# Patient Record
Sex: MALE | Race: WHITE | NOT HISPANIC OR LATINO | Employment: FULL TIME | URBAN - METROPOLITAN AREA
[De-identification: names, ages, dates, MRNs, and addresses within clinical notes are randomized per-mention and may not be internally consistent; named-entity substitution may affect disease eponyms.]

---

## 2019-01-14 ENCOUNTER — OFFICE VISIT - HEALTHEAST (OUTPATIENT)
Dept: FAMILY MEDICINE | Facility: CLINIC | Age: 54
End: 2019-01-14

## 2019-01-14 DIAGNOSIS — K04.7 DENTAL ABSCESS: ICD-10-CM

## 2019-01-14 ASSESSMENT — MIFFLIN-ST. JEOR: SCORE: 1731.22

## 2019-04-03 ENCOUNTER — RECORDS - HEALTHEAST (OUTPATIENT)
Dept: ADMINISTRATIVE | Facility: OTHER | Age: 54
End: 2019-04-03

## 2021-05-01 ENCOUNTER — HEALTH MAINTENANCE LETTER (OUTPATIENT)
Age: 56
End: 2021-05-01

## 2021-05-25 ENCOUNTER — RECORDS - HEALTHEAST (OUTPATIENT)
Dept: ADMINISTRATIVE | Facility: CLINIC | Age: 56
End: 2021-05-25

## 2021-06-02 VITALS — BODY MASS INDEX: 23.1 KG/M2 | HEIGHT: 74 IN | WEIGHT: 180 LBS

## 2021-06-23 NOTE — PROGRESS NOTES
"Assessment:     1. Dental abscess            Plan:     Differential diagnosis include but not limited to dental abscess or submandibular abscess.  Discussed with the patient on exam he does have an abscess, in this clinic when not equipped to do dental procedures therefore I will refer him to emergency room for possible I&D and may be CT scan to make sure that he does not have submandibular abscess.  Patient was in agreement with the plan of care, he would drive his personal vehicle to the emergency room.    Subjective:       53 y.o. male presents for evaluation of possible dental abscess.  Patient reports that his symptoms started on Thursday, he has been having the symptoms for the past 5 days.  He was seen at the dentist today where he was sent to the emergency room the patient came here at the walk-in clinic instead.  He admits that it is painful on the left side of his cheek, he also notes that it swollen.  Currently his pain level is 10 out of 10.  He has not taken any medications, Tylenol currently not working.  He was not given any medications from the dentist.    The following portions of the patient's history were reviewed and updated as appropriate: allergies, current medications, past family history, past medical history, past social history, past surgical history and problem list.    Review of Systems  A 12 point comprehensive review of systems was negative except as noted.      Objective:      /88 (Patient Site: Right Arm, Patient Position: Sitting, Cuff Size: Adult Large)   Pulse 97   Temp 97.9  F (36.6  C) (Oral)   Resp 18   Ht 6' 2\" (1.88 m)   Wt 180 lb (81.6 kg)   SpO2 99%   BMI 23.11 kg/m    General appearance: alert, appears stated age, cooperative and moderate distress  Head: Normocephalic, without obvious abnormality, atraumatic, sinuses nontender to percussion  Eyes: conjunctivae/corneas clear. PERRL, EOM's intact. Fundi benign.  Ears: normal TM's and external ear canals both " ears  Nose: Nares normal. Septum midline. Mucosa normal. No drainage or sinus tenderness.  Throat: abnormal findings: Abscess noted on the left side of the mandible, pain with palpation  Neck: no adenopathy, no carotid bruit, no JVD, supple, symmetrical, trachea midline, thyroid not enlarged, symmetric, no tenderness/mass/nodules and Moderate pain with palpation to the left side of the neck.  Lungs: clear to auscultation bilaterally  Heart: regular rate and rhythm, S1, S2 normal, no murmur, click, rub or gallop  Extremities: extremities normal, atraumatic, no cyanosis or edema  Pulses: 2+ and symmetric  Skin: Skin color, texture, turgor normal. No rashes or lesions  Lymph nodes: Cervical, supraclavicular, and axillary nodes normal.  Neurologic: Grossly normal     This note has been dictated using voice recognition software. Any grammatical or context distortions are unintentional and inherent to the software

## 2021-10-16 ENCOUNTER — HEALTH MAINTENANCE LETTER (OUTPATIENT)
Age: 56
End: 2021-10-16

## 2022-01-06 ENCOUNTER — IMMUNIZATION (OUTPATIENT)
Dept: NURSING | Facility: CLINIC | Age: 57
End: 2022-01-06
Payer: COMMERCIAL

## 2022-01-06 PROCEDURE — 91306 COVID-19,PF,MODERNA (18+ YRS BOOSTER .25ML): CPT

## 2022-01-06 PROCEDURE — 0064A COVID-19,PF,MODERNA (18+ YRS BOOSTER .25ML): CPT

## 2022-05-22 ENCOUNTER — HEALTH MAINTENANCE LETTER (OUTPATIENT)
Age: 57
End: 2022-05-22

## 2022-09-25 ENCOUNTER — HEALTH MAINTENANCE LETTER (OUTPATIENT)
Age: 57
End: 2022-09-25

## 2023-06-04 ENCOUNTER — HEALTH MAINTENANCE LETTER (OUTPATIENT)
Age: 58
End: 2023-06-04

## 2024-06-14 ENCOUNTER — APPOINTMENT (OUTPATIENT)
Dept: CT IMAGING | Facility: CLINIC | Age: 59
End: 2024-06-14
Attending: PHYSICIAN ASSISTANT

## 2024-06-14 ENCOUNTER — HOSPITAL ENCOUNTER (EMERGENCY)
Facility: CLINIC | Age: 59
Discharge: HOME | End: 2024-06-14
Admitting: EMERGENCY MEDICINE

## 2024-06-14 VITALS
WEIGHT: 200 LBS | SYSTOLIC BLOOD PRESSURE: 143 MMHG | BODY MASS INDEX: 25.67 KG/M2 | HEART RATE: 62 BPM | RESPIRATION RATE: 19 BRPM | OXYGEN SATURATION: 96 % | DIASTOLIC BLOOD PRESSURE: 99 MMHG | HEIGHT: 74 IN | TEMPERATURE: 97.8 F

## 2024-06-14 DIAGNOSIS — N20.1 URETEROLITHIASIS: ICD-10-CM

## 2024-06-14 LAB
ALBUMIN UR-MCNC: 10 MG/DL
AMORPH CRY #/AREA URNS HPF: ABNORMAL /HPF
ANION GAP SERPL CALCULATED.3IONS-SCNC: 11 MMOL/L (ref 7–15)
APPEARANCE UR: ABNORMAL
BILIRUB UR QL STRIP: NEGATIVE
BUN SERPL-MCNC: 24.9 MG/DL (ref 6–20)
CALCIUM SERPL-MCNC: 11.8 MG/DL (ref 8.6–10)
CHLORIDE SERPL-SCNC: 102 MMOL/L (ref 98–107)
COLOR UR AUTO: ABNORMAL
CREAT SERPL-MCNC: 1.47 MG/DL (ref 0.67–1.17)
DEPRECATED HCO3 PLAS-SCNC: 24 MMOL/L (ref 22–29)
EGFRCR SERPLBLD CKD-EPI 2021: 56 ML/MIN/1.73M2
ERYTHROCYTE [DISTWIDTH] IN BLOOD BY AUTOMATED COUNT: 12.2 % (ref 10–15)
GLUCOSE SERPL-MCNC: 109 MG/DL (ref 70–99)
GLUCOSE UR STRIP-MCNC: NEGATIVE MG/DL
HCT VFR BLD AUTO: 52.7 % (ref 40–53)
HGB BLD-MCNC: 17.6 G/DL (ref 13.3–17.7)
HGB UR QL STRIP: ABNORMAL
HYALINE CASTS: 5 /LPF
KETONES UR STRIP-MCNC: NEGATIVE MG/DL
LACTATE SERPL-SCNC: 0.9 MMOL/L (ref 0.7–2)
LEUKOCYTE ESTERASE UR QL STRIP: ABNORMAL
MCH RBC QN AUTO: 31.3 PG (ref 26.5–33)
MCHC RBC AUTO-ENTMCNC: 33.4 G/DL (ref 31.5–36.5)
MCV RBC AUTO: 94 FL (ref 78–100)
MUCOUS THREADS #/AREA URNS LPF: PRESENT /LPF
NITRATE UR QL: NEGATIVE
PH UR STRIP: 7 [PH] (ref 5–7)
PLATELET # BLD AUTO: 239 10E3/UL (ref 150–450)
POTASSIUM SERPL-SCNC: 4.5 MMOL/L (ref 3.4–5.3)
RBC # BLD AUTO: 5.63 10E6/UL (ref 4.4–5.9)
RBC URINE: 16 /HPF
SODIUM SERPL-SCNC: 137 MMOL/L (ref 135–145)
SP GR UR STRIP: 1.01 (ref 1–1.03)
UROBILINOGEN UR STRIP-MCNC: <2 MG/DL
WBC # BLD AUTO: 15.2 10E3/UL (ref 4–11)
WBC URINE: 7 /HPF

## 2024-06-14 PROCEDURE — 250N000011 HC RX IP 250 OP 636: Mod: JZ | Performed by: PHYSICIAN ASSISTANT

## 2024-06-14 PROCEDURE — 85027 COMPLETE CBC AUTOMATED: CPT | Performed by: PHYSICIAN ASSISTANT

## 2024-06-14 PROCEDURE — 258N000003 HC RX IP 258 OP 636: Mod: JZ | Performed by: EMERGENCY MEDICINE

## 2024-06-14 PROCEDURE — 96361 HYDRATE IV INFUSION ADD-ON: CPT

## 2024-06-14 PROCEDURE — 250N000011 HC RX IP 250 OP 636: Mod: JZ | Performed by: EMERGENCY MEDICINE

## 2024-06-14 PROCEDURE — 74176 CT ABD & PELVIS W/O CONTRAST: CPT

## 2024-06-14 PROCEDURE — 83605 ASSAY OF LACTIC ACID: CPT | Performed by: EMERGENCY MEDICINE

## 2024-06-14 PROCEDURE — 99285 EMERGENCY DEPT VISIT HI MDM: CPT | Mod: 25

## 2024-06-14 PROCEDURE — 36415 COLL VENOUS BLD VENIPUNCTURE: CPT | Performed by: PHYSICIAN ASSISTANT

## 2024-06-14 PROCEDURE — 87086 URINE CULTURE/COLONY COUNT: CPT | Performed by: EMERGENCY MEDICINE

## 2024-06-14 PROCEDURE — 36415 COLL VENOUS BLD VENIPUNCTURE: CPT | Performed by: EMERGENCY MEDICINE

## 2024-06-14 PROCEDURE — 96374 THER/PROPH/DIAG INJ IV PUSH: CPT

## 2024-06-14 PROCEDURE — 80048 BASIC METABOLIC PNL TOTAL CA: CPT | Performed by: PHYSICIAN ASSISTANT

## 2024-06-14 PROCEDURE — 96375 TX/PRO/DX INJ NEW DRUG ADDON: CPT

## 2024-06-14 PROCEDURE — 81003 URINALYSIS AUTO W/O SCOPE: CPT | Performed by: EMERGENCY MEDICINE

## 2024-06-14 RX ORDER — OXYCODONE HYDROCHLORIDE 5 MG/1
5 TABLET ORAL EVERY 4 HOURS PRN
Qty: 12 TABLET | Refills: 0 | Status: SHIPPED | OUTPATIENT
Start: 2024-06-14 | End: 2024-06-18

## 2024-06-14 RX ORDER — ONDANSETRON 2 MG/ML
4 INJECTION INTRAMUSCULAR; INTRAVENOUS ONCE
Status: COMPLETED | OUTPATIENT
Start: 2024-06-14 | End: 2024-06-14

## 2024-06-14 RX ORDER — KETOROLAC TROMETHAMINE 15 MG/ML
15 INJECTION, SOLUTION INTRAMUSCULAR; INTRAVENOUS ONCE
Status: COMPLETED | OUTPATIENT
Start: 2024-06-14 | End: 2024-06-14

## 2024-06-14 RX ORDER — TAMSULOSIN HYDROCHLORIDE 0.4 MG/1
0.4 CAPSULE ORAL DAILY
Qty: 5 CAPSULE | Refills: 0 | Status: SHIPPED | OUTPATIENT
Start: 2024-06-14 | End: 2024-06-19

## 2024-06-14 RX ORDER — DIMENHYDRINATE 50 MG
50 TABLET ORAL AT BEDTIME
Qty: 7 TABLET | Refills: 0 | Status: SHIPPED | OUTPATIENT
Start: 2024-06-14 | End: 2024-06-21

## 2024-06-14 RX ORDER — HYDROMORPHONE HYDROCHLORIDE 1 MG/ML
0.5 INJECTION, SOLUTION INTRAMUSCULAR; INTRAVENOUS; SUBCUTANEOUS ONCE
Status: COMPLETED | OUTPATIENT
Start: 2024-06-14 | End: 2024-06-14

## 2024-06-14 RX ORDER — IBUPROFEN 200 MG
400 TABLET ORAL EVERY 6 HOURS
Qty: 56 TABLET | Refills: 0 | Status: SHIPPED | OUTPATIENT
Start: 2024-06-14 | End: 2024-06-21

## 2024-06-14 RX ORDER — DIMENHYDRINATE 50 MG
50 TABLET ORAL EVERY 6 HOURS PRN
Qty: 28 TABLET | Refills: 0 | Status: SHIPPED | OUTPATIENT
Start: 2024-06-14 | End: 2024-06-21

## 2024-06-14 RX ORDER — ACETAMINOPHEN 500 MG
1000 TABLET ORAL EVERY 6 HOURS
Qty: 56 TABLET | Refills: 0 | Status: SHIPPED | OUTPATIENT
Start: 2024-06-14 | End: 2024-06-21

## 2024-06-14 RX ORDER — CIPROFLOXACIN 500 MG/1
500 TABLET, FILM COATED ORAL 2 TIMES DAILY
Qty: 14 TABLET | Refills: 0 | Status: SHIPPED | OUTPATIENT
Start: 2024-06-14 | End: 2024-06-21

## 2024-06-14 RX ADMIN — HYDROMORPHONE HYDROCHLORIDE 0.5 MG: 1 INJECTION, SOLUTION INTRAMUSCULAR; INTRAVENOUS; SUBCUTANEOUS at 16:30

## 2024-06-14 RX ADMIN — SODIUM CHLORIDE 1000 ML: 9 INJECTION, SOLUTION INTRAVENOUS at 16:30

## 2024-06-14 RX ADMIN — KETOROLAC TROMETHAMINE 15 MG: 15 INJECTION, SOLUTION INTRAMUSCULAR; INTRAVENOUS at 13:55

## 2024-06-14 RX ADMIN — ONDANSETRON 4 MG: 2 INJECTION INTRAMUSCULAR; INTRAVENOUS at 13:55

## 2024-06-14 ASSESSMENT — ENCOUNTER SYMPTOMS
FEVER: 1
VOMITING: 0
DIARRHEA: 0
CHILLS: 1
DYSURIA: 0
HEMATURIA: 0
ABDOMINAL PAIN: 1
FLANK PAIN: 1
NAUSEA: 1

## 2024-06-14 ASSESSMENT — COLUMBIA-SUICIDE SEVERITY RATING SCALE - C-SSRS
2. HAVE YOU ACTUALLY HAD ANY THOUGHTS OF KILLING YOURSELF IN THE PAST MONTH?: NO
6. HAVE YOU EVER DONE ANYTHING, STARTED TO DO ANYTHING, OR PREPARED TO DO ANYTHING TO END YOUR LIFE?: NO
1. IN THE PAST MONTH, HAVE YOU WISHED YOU WERE DEAD OR WISHED YOU COULD GO TO SLEEP AND NOT WAKE UP?: NO

## 2024-06-14 ASSESSMENT — ACTIVITIES OF DAILY LIVING (ADL)
ADLS_ACUITY_SCORE: 35
ADLS_ACUITY_SCORE: 35

## 2024-06-14 NOTE — ED TRIAGE NOTES
PT endorses right flank pain that started yesterday and has continued to get worst. Endorses nausea at this time. No hx of kidney stones.      Triage Assessment (Adult)       Row Name 06/14/24 1235          Triage Assessment    Airway WDL WDL        Respiratory WDL    Respiratory WDL WDL        Skin Circulation/Temperature WDL    Skin Circulation/Temperature WDL WDL        Cardiac WDL    Cardiac WDL X     Cardiac Rhythm ST        Peripheral/Neurovascular WDL    Peripheral Neurovascular WDL WDL        Cognitive/Neuro/Behavioral WDL    Cognitive/Neuro/Behavioral WDL WDL

## 2024-06-14 NOTE — ED PROVIDER NOTES
EMERGENCY DEPARTMENT ENCOUNTER      NAME: Amarjit Cano  AGE: 58 year old male  YOB: 1965  MRN: [id number]  EVALUATION DATE & TIME: No admission date for patient encounter.    PCP: No Ref-Primary, Physician    ED PROVIDER: Ambreen Parisi PA-C      Chief Complaint   Patient presents with    Flank Pain         FINAL IMPRESSION:  1. Ureterolithiasis          ED COURSE & MEDICAL DECISION MAKING:    Pertinent Labs & Imaging studies reviewed. (See chart for details)    58 year old male presents to the Emergency Department for evaluation of flank pain.    Physical exam is remarkable for an uncomfortable appearing male.  Heart and lung sounds are clear diffusely throughout.  Abdomen is soft and nontender.  He has right CVA tenderness.  Vital signs initially remarkable for tachycardia and hypertension, likely secondary to pain; remainder are stable and he is afebrile.    CBC is remarkable for leukocytosis with white blood cell count of 15.2, no anemia noted.  BMP is remarkable for a mild PITO with creatinine of 1.47, GFR 56, BUN of 25; no significant electrolyte derangements but patient is noted to have hypercalcemia with calcium of 11.8.  Lactic acid is within normal limits.  Urinalysis with blood, leukocyte esterase, and pyuria but no significant evidence of infection.  CT of the abdomen does show an obstructing 8 x 5 mm stone at the right UPJ with associated hydronephrosis and perinephric edema on that side.    The patient was given Toradol, IV fluids, Zofran, and Dilaudid with improvement in his pain.  I discussed his presentation with on-call urology, the stone likely will not pass on its own however the patient wants to discharge home.  I think that is reasonable given that his pain is relatively well-controlled here and he does not have any evidence of kidney failure at this time or urosepsis.  Urology recommends ciprofloxacin for antibiosis given leukocytosis and patient will be discharged with  typical [facility] medications (Tylenol, ibuprofen, Dramamine, and oxycodone) as well as Flomax.  I advised him to follow-up with urology and a referral was placed.  Recommend return here for any new or worsening symptoms.  The patient is agreeable with this treatment plan and verbalized understanding.      Medical Decision Making    History:  Supplemental history from: Documented in chart  External Record(s) reviewed: Documented in chart    Work Up:  Chart documentation includes differential considered and any EKGs or imaging independently interpreted by provider, where specified.  In additional to work up documented, I considered the following work up: Documented in chart, if applicable.    External consultation:  Discussion of management with another provider: Urology    Complicating factors:  Care impacted by chronic illness: N/A  Care affected by social determinants of health: Access to Affordable Health Care and No Support for Care at Home    Disposition considerations:  Discussed admission; patient requested discharge home. He was discharged with new prescription medications.    ED Course   2:31 PM Performed my initial history and physical exam. Discussed workup in the emergency department, management of symptoms, and likely disposition.   3:35 PM Discussed with Dr. Zeferino Sifuentes with [facility].   4:50 PM I discussed the plan for discharge with the patient or family and they are agreeable.. We discussed supportive cares at home and reasons for return to the ER including new or worsening symptoms - all questions and concerns addressed. Patient to be discharged by RN.    At the conclusion of the encounter I discussed the results of all of the tests and the disposition. The questions were answered. The patient or family acknowledged understanding and was agreeable with the care plan.     Voice recognition software was used in the creation of this note. Any grammatical or nonsensical errors are due to inherent errors with  the software and are not the intention of the writer.     MEDICATIONS GIVEN IN THE EMERGENCY:  Medications   sodium chloride 0.9% BOLUS 1,000 mL (1,000 mLs Intravenous $New Bag 6/14/24 1630)   ketorolac (TORADOL) injection 15 mg (15 mg Intravenous $Given 6/14/24 1355)   ondansetron (ZOFRAN) injection 4 mg (4 mg Intravenous $Given 6/14/24 1355)   HYDROmorphone (PF) (DILAUDID) injection 0.5 mg (0.5 mg Intravenous $Given 6/14/24 1630)       NEW PRESCRIPTIONS STARTED AT TODAY'S ER VISIT  New Prescriptions    ACETAMINOPHEN (TYLENOL) 500 MG TABLET    Take 2 tablets (1,000 mg) by mouth every 6 hours for 7 days    CIPROFLOXACIN (CIPRO) 500 MG TABLET    Take 1 tablet (500 mg) by mouth 2 times daily for 7 days    DIMENHYDRINATE (DRAMAMINE) 50 MG TABLET    Take 1 tablet (50 mg) by mouth at bedtime for 7 days    DIMENHYDRINATE (DRAMAMINE) 50 MG TABLET    Take 1 tablet (50 mg) by mouth every 6 hours as needed for other (kidney stone pain management)    IBUPROFEN (ADVIL/MOTRIN) 200 MG TABLET    Take 2 tablets (400 mg) by mouth every 6 hours for 7 days    OXYCODONE (ROXICODONE) 5 MG TABLET    Take 1 tablet (5 mg) by mouth every 4 hours as needed for severe pain If pain is not improved with acetaminophen and ibuprofen.    TAMSULOSIN (FLOMAX) 0.4 MG CAPSULE    Take 1 capsule (0.4 mg) by mouth daily for 5 days            =================================================================    HPI    Patient information was obtained from: Patient    Use of : N/A         Amarjit Cano is a 58 year old male who presents to the ED via walk-in for evaluation of flank pain.     The patient reports that yesterday, he had sudden onset of right flank pain that radiates toward his right abdomen.  He describes the pain as sharp and it intermittently becomes more intense.  At its worst, he rates the pain 10 out of 10.  He has not been able to identify any aggravating or alleviating factors.  He does have some associated nausea and  "endorses subjective fever/chills.    He denies any abdominal pain, dysuria, hematuria.    REVIEW OF SYSTEMS   Review of Systems   Constitutional:  Positive for chills and fever (Subjective).   Gastrointestinal:  Positive for abdominal pain and nausea. Negative for diarrhea and vomiting.   Genitourinary:  Positive for flank pain. Negative for dysuria and hematuria.       All other systems reviewed and are negative unless noted in HPI.      PAST MEDICAL HISTORY:  No past medical history on file.    PAST SURGICAL HISTORY:  No past surgical history on file.    CURRENT MEDICATIONS:    acetaminophen (TYLENOL) 500 MG tablet  ciprofloxacin (CIPRO) 500 MG tablet  dimenhyDRINATE (DRAMAMINE) 50 MG tablet  dimenhyDRINATE (DRAMAMINE) 50 MG tablet  ibuprofen (ADVIL/MOTRIN) 200 MG tablet  oxyCODONE (ROXICODONE) 5 MG tablet  tamsulosin (FLOMAX) 0.4 MG capsule  ibuprofen (ADVIL,MOTRIN) 800 MG tablet  oxyCODONE (ROXICODONE) 5 MG immediate release tablet  traMADol (ULTRAM) 50 mg tablet        ALLERGIES:  No Known Allergies    FAMILY HISTORY:  No family history on file.    SOCIAL HISTORY:   Social History     Socioeconomic History    Marital status: Single   Tobacco Use    Smoking status: Every Day     Current packs/day: 0.50     Types: Cigarettes    Smokeless tobacco: Former     Types: Chew       VITALS:  Patient Vitals for the past 24 hrs:   BP Temp Pulse Resp SpO2 Height Weight   06/14/24 1630 (!) 150/109 -- 71 -- 97 % -- --   06/14/24 1234 (!) 149/104 97.8  F (36.6  C) 108 19 97 % 1.88 m (6' 2\") 90.7 kg (200 lb)       PHYSICAL EXAM    VITAL SIGNS: BP (!) 150/109   Pulse 71   Temp 97.8  F (36.6  C)   Resp 19   Ht 1.88 m (6' 2\")   Wt 90.7 kg (200 lb)   SpO2 97%   BMI 25.68 kg/m    General Appearance: Uncomfortable appearing; Alert, cooperative, normal speech and facial symmetry, appears stated age  Head:  Normocephalic, without obvious abnormality, atraumatic  Cardio:  Regular rate and rhythm, S1 and S2 normal, no murmur, " rub    or gallop, 2+ pulses symmetric in all extremities  Pulm:  Clear to auscultation bilaterally, respirations unlabored with no accessory muscle use  Abdomen:  Active bowel sounds in all quadrants; abdomen is soft, non-distended with no tenderness to palpation, rebound tenderness, or guarding.   Back: Right CVA tenderness  Extremities: Moves all extremities  Neuro: Patient is awake, alert, and responsive to voice. No gross motor weaknesses or sensory loss; moves all extremities.    LAB:  All pertinent labs reviewed and interpreted.  Labs Ordered and Resulted from Time of ED Arrival to Time of ED Departure   ROUTINE UA WITH MICROSCOPIC REFLEX TO CULTURE - Abnormal       Result Value    Color Urine Light Yellow      Appearance Urine Turbid (*)     Glucose Urine Negative      Bilirubin Urine Negative      Ketones Urine Negative      Specific Gravity Urine 1.015      Blood Urine 0.2 mg/dL (*)     pH Urine 7.0      Protein Albumin Urine 10 (*)     Urobilinogen Urine <2.0      Nitrite Urine Negative      Leukocyte Esterase Urine 25 Solitario/uL (*)     Mucus Urine Present (*)     Amorphous Crystals Urine Few (*)     RBC Urine 16 (*)     WBC Urine 7 (*)     Hyaline Casts Urine 5 (*)    CBC WITH PLATELETS - Abnormal    WBC Count 15.2 (*)     RBC Count 5.63      Hemoglobin 17.6      Hematocrit 52.7      MCV 94      MCH 31.3      MCHC 33.4      RDW 12.2      Platelet Count 239     BASIC METABOLIC PANEL - Abnormal    Sodium 137      Potassium 4.5      Chloride 102      Carbon Dioxide (CO2) 24      Anion Gap 11      Urea Nitrogen 24.9 (*)     Creatinine 1.47 (*)     GFR Estimate 56 (*)     Calcium 11.8 (*)     Glucose 109 (*)    LACTIC ACID WHOLE BLOOD - Normal    Lactic Acid 0.9     URINE CULTURE       RADIOLOGY:  Reviewed all pertinent imaging. Please see official radiology report.  CT Abdomen Pelvis w/o Contrast   Final Result   IMPRESSION:       1.  Obstructing 8 x 5 x 4 mm calculus at the right uteropelvic junction resulting  in moderate right hydronephrosis and asymmetric right perinephric edema.      2.  Multiple nonobstructing left renal calculi measuring up to 4 mm.              Ambreen Parisi PA-C  Emergency Medicine  [facility] EMERGENCY ROOM  [address] [id number] [phone]  Dept: [phone]       Ambreen Parisi PA-C  06/14/24 1707

## 2024-06-14 NOTE — DISCHARGE INSTRUCTIONS
You were seen here today for evaluation of flank pain.  You do have quite a large stone in your right ureter which is likely the cause of your pain.  Please follow the [facility] instructions for pain management.    Take antibiotics as prescribed.  Take Flomax daily as prescribed.    Call [facility] to schedule a follow-up appointment as this stone will likely not pass on its own.    Return to the emergency department for any new or worsening symptoms including severe pain, fever, inability to urinate, persistent vomiting, or any other symptoms that concern you.

## 2024-06-16 LAB — BACTERIA UR CULT: NO GROWTH

## 2024-07-20 ENCOUNTER — HEALTH MAINTENANCE LETTER (OUTPATIENT)
Age: 59
End: 2024-07-20

## 2025-07-18 ENCOUNTER — HOSPITAL ENCOUNTER (EMERGENCY)
Facility: CLINIC | Age: 60
Discharge: HOME | End: 2025-07-18
Attending: FAMILY MEDICINE | Admitting: FAMILY MEDICINE
Payer: COMMERCIAL

## 2025-07-18 ENCOUNTER — APPOINTMENT (OUTPATIENT)
Dept: RADIOLOGY | Facility: CLINIC | Age: 60
End: 2025-07-18
Payer: COMMERCIAL

## 2025-07-18 VITALS
RESPIRATION RATE: 20 BRPM | DIASTOLIC BLOOD PRESSURE: 103 MMHG | WEIGHT: 200 LBS | SYSTOLIC BLOOD PRESSURE: 143 MMHG | TEMPERATURE: 98.9 F | HEART RATE: 85 BPM | BODY MASS INDEX: 25.67 KG/M2 | HEIGHT: 74 IN | OXYGEN SATURATION: 96 %

## 2025-07-18 DIAGNOSIS — I50.9 ACUTE EXACERBATION OF CHF (CONGESTIVE HEART FAILURE) (H): ICD-10-CM

## 2025-07-18 LAB
ANION GAP SERPL CALCULATED.3IONS-SCNC: 10 MMOL/L (ref 7–15)
ATRIAL RATE - MUSE: 87 BPM
BASOPHILS # BLD AUTO: 0.1 10E3/UL (ref 0–0.2)
BASOPHILS NFR BLD AUTO: 1 %
BUN SERPL-MCNC: 30.2 MG/DL (ref 6–20)
CALCIUM SERPL-MCNC: 10.7 MG/DL (ref 8.8–10.4)
CHLORIDE SERPL-SCNC: 107 MMOL/L (ref 98–107)
CREAT SERPL-MCNC: 1.42 MG/DL (ref 0.67–1.17)
DIASTOLIC BLOOD PRESSURE - MUSE: NORMAL MMHG
EGFRCR SERPLBLD CKD-EPI 2021: 58 ML/MIN/1.73M2
EOSINOPHIL # BLD AUTO: 0.1 10E3/UL (ref 0–0.7)
EOSINOPHIL NFR BLD AUTO: 2 %
ERYTHROCYTE [DISTWIDTH] IN BLOOD BY AUTOMATED COUNT: 13.8 % (ref 10–15)
GLUCOSE SERPL-MCNC: 106 MG/DL (ref 70–99)
HCO3 SERPL-SCNC: 22 MMOL/L (ref 22–29)
HCT VFR BLD AUTO: 45.2 % (ref 40–53)
HGB BLD-MCNC: 14.6 G/DL (ref 13.3–17.7)
HOLD SPECIMEN: NORMAL
HOLD SPECIMEN: NORMAL
IMM GRANULOCYTES # BLD: 0 10E3/UL
IMM GRANULOCYTES NFR BLD: 1 %
INTERPRETATION ECG - MUSE: NORMAL
LYMPHOCYTES # BLD AUTO: 1.3 10E3/UL (ref 0.8–5.3)
LYMPHOCYTES NFR BLD AUTO: 20 %
MAGNESIUM SERPL-MCNC: 1.7 MG/DL (ref 1.7–2.3)
MCH RBC QN AUTO: 30.9 PG (ref 26.5–33)
MCHC RBC AUTO-ENTMCNC: 32.3 G/DL (ref 31.5–36.5)
MCV RBC AUTO: 96 FL (ref 78–100)
MONOCYTES # BLD AUTO: 0.8 10E3/UL (ref 0–1.3)
MONOCYTES NFR BLD AUTO: 12 %
NEUTROPHILS # BLD AUTO: 4.2 10E3/UL (ref 1.6–8.3)
NEUTROPHILS NFR BLD AUTO: 65 %
NRBC # BLD AUTO: 0 10E3/UL
NRBC BLD AUTO-RTO: 0 /100
NT-PROBNP SERPL-MCNC: 5811 PG/ML (ref 0–177)
P AXIS - MUSE: 60 DEGREES
PLATELET # BLD AUTO: 168 10E3/UL (ref 150–450)
POTASSIUM SERPL-SCNC: 4.4 MMOL/L (ref 3.4–5.3)
PR INTERVAL - MUSE: 158 MS
QRS DURATION - MUSE: 106 MS
QT - MUSE: 382 MS
QTC - MUSE: 459 MS
R AXIS - MUSE: -54 DEGREES
RBC # BLD AUTO: 4.73 10E6/UL (ref 4.4–5.9)
SODIUM SERPL-SCNC: 139 MMOL/L (ref 135–145)
SYSTOLIC BLOOD PRESSURE - MUSE: NORMAL MMHG
T AXIS - MUSE: 115 DEGREES
TROPONIN T SERPL HS-MCNC: 48 NG/L
TROPONIN T SERPL HS-MCNC: 48 NG/L
TSH SERPL DL<=0.005 MIU/L-ACNC: 1.79 UIU/ML (ref 0.3–4.2)
VENTRICULAR RATE- MUSE: 87 BPM
WBC # BLD AUTO: 6.5 10E3/UL (ref 4–11)

## 2025-07-18 PROCEDURE — 80048 BASIC METABOLIC PNL TOTAL CA: CPT | Performed by: FAMILY MEDICINE

## 2025-07-18 PROCEDURE — 84484 ASSAY OF TROPONIN QUANT: CPT | Performed by: FAMILY MEDICINE

## 2025-07-18 PROCEDURE — 93005 ELECTROCARDIOGRAM TRACING: CPT | Performed by: FAMILY MEDICINE

## 2025-07-18 PROCEDURE — 36415 COLL VENOUS BLD VENIPUNCTURE: CPT | Performed by: FAMILY MEDICINE

## 2025-07-18 PROCEDURE — 83880 ASSAY OF NATRIURETIC PEPTIDE: CPT | Performed by: FAMILY MEDICINE

## 2025-07-18 PROCEDURE — 96374 THER/PROPH/DIAG INJ IV PUSH: CPT

## 2025-07-18 PROCEDURE — 83735 ASSAY OF MAGNESIUM: CPT

## 2025-07-18 PROCEDURE — 99285 EMERGENCY DEPT VISIT HI MDM: CPT | Mod: 25 | Performed by: FAMILY MEDICINE

## 2025-07-18 PROCEDURE — 71046 X-RAY EXAM CHEST 2 VIEWS: CPT

## 2025-07-18 PROCEDURE — 85004 AUTOMATED DIFF WBC COUNT: CPT | Performed by: FAMILY MEDICINE

## 2025-07-18 PROCEDURE — 250N000011 HC RX IP 250 OP 636

## 2025-07-18 PROCEDURE — 84443 ASSAY THYROID STIM HORMONE: CPT

## 2025-07-18 RX ORDER — FUROSEMIDE 40 MG/1
40 TABLET ORAL DAILY
Qty: 14 TABLET | Refills: 0 | Status: SHIPPED | OUTPATIENT
Start: 2025-07-18 | End: 2025-08-01

## 2025-07-18 RX ORDER — FUROSEMIDE 10 MG/ML
40 INJECTION INTRAMUSCULAR; INTRAVENOUS ONCE
Status: COMPLETED | OUTPATIENT
Start: 2025-07-18 | End: 2025-07-18

## 2025-07-18 RX ADMIN — FUROSEMIDE 40 MG: 10 INJECTION, SOLUTION INTRAVENOUS at 15:29

## 2025-07-18 ASSESSMENT — ACTIVITIES OF DAILY LIVING (ADL)
ADLS_ACUITY_SCORE: 41

## 2025-07-18 NOTE — ED PROVIDER NOTES
Emergency Department Encounter   NAME: Amarjit Cano  AGE: 59 year old male   YOB: 1965 ;   MRN: [id number] ;    ED PROVIDER: Daysi Allen PA-C    PCP: No Ref-Primary, Physician    Evaluation Date & Time:   7/18/2025  2:17 PM    CHIEF COMPLAINT:  Leg Swelling and Shortness of Breath      FINAL IMPRESSION:    ICD-10-CM    1. Acute exacerbation of CHF (congestive heart failure) (H)  I50.9 Primary Care Referral            IMPRESSION AND PLAN   MDM: Amarjit Cano is a 59 year old male with a pertinent history of PTSD who presents to the ED by walk-in for evaluation of SOB and BL lower extremity swelling that he's noted for the last several weeks. Patient does not doctor.    Vitals - hypertensive otherwise stable. On exam there is diffuse, end-expiratory wheezing. Cardiac exam otherwise benign. 2+ BL LE edema. Dorsalis pedis and radial pulses 2+ bilaterally. Differentials include CHF, COPD, asthma, ACS, pleural effusion, pneumothorax. Low suspicion for dissection given stable vitals and distal pulses. No fever, chills to suggest infectious process including pneumonia, endocarditis, pericarditis, myositis.     Blood work reviewed and notable for an elevated creatinine at 1.42, this appears to be his baseline from one year ago. BNP elevated at almost 5900 suggestive of CHF. Initial troponin elevated at 48, will trend. ECG without any acute ischemic changes. Delta trop again at 48, do not suspect ACS. CXR without evidence of pulmonary edema or pleural effusion, although it did reveal an enlarged cardiac silhouette likely consistent with CHF. I discussed these findings with patient and provided reassurance. Patient is satting appropriately on RA, do not feel that admission is necessary. He was given a dose of IV Lasix in the ED. I did prescribe a 14 day course of Lasix and an emergent PCP referral for continued management and potential cardiology referral. I discussed strict return precautions and  recommended smoking cessation. All questions and concerns addressed. Patient is overall agreeable to this plan and feels comfortable with discharge at this time.      MIPS (CTPE, Dental pain, Ferraro, Sinusitis, Asthma/COPD, Head Trauma): Not Applicable    SEPSIS: None      ED COURSE:  2:39 PM I met and introduced myself to the patient. I gathered initial history and performed my physical exam. We discussed plan for initial workup.   2:44 PM I have staffed the patient with Dr. Kulkarni, ED MD, who has evaluated the patient and agrees with all aspects of today's care.   4:53 PM I rechecked the patient and discussed results, discharge, follow up, and reasons to return to the ED.        MEDICATIONS GIVEN IN THE EMERGENCY DEPARTMENT:  Medications   furosemide (LASIX) injection 40 mg (40 mg Intravenous $Given 7/18/25 4963)         NEW PRESCRIPTIONS STARTED AT TODAY'S ED VISIT:  New Prescriptions    FUROSEMIDE (LASIX) 40 MG TABLET    Take 1 tablet (40 mg) by mouth daily for 14 days.         BRIEF HPI   Patient information was obtained from: patient    Use of Intrepreter: N/A     Amarjit OLEGARIO Rachael is a 59 year old male with a pertinent history of post traumatic stress disorder who presents to the ED by walk in solo for evaluation of leg swelling and shortness of breath.     Patient endorses shortness of breath and leg swelling for about 2 weeks. He went to his medical truck at his job to get his blood pressure checked. He mentions he's been suffering from high blood pressure for some time, he was supposed to go on a medication for it multiple years ago but the medication he tried made him dizzy and unstable so he discontinued that. Stated he has been struggling with allergies this spring/summer. 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 For the past month he has been more tired, breathing harder, and occasional calf cramps in both calves. Reports that he has family heart history related with brother and father. Endorses possible hernia in his left lower abdomen for abut 1 year, not much pain just notices it with movement. Also had a work related injury where a nail \"punctured\" his skull which resulted in a bump on the left side of his head about 20 years ago, seems like the bump is getting bigger.     States he has normal fluid intake but decreased apatite. Mentions that he has some family and domestic things going on outside of work that are causing him stress. He does not take any medication. Smokes about 5-6 cigarettes a day, notes that is a cut back from what he used to smoke. Has normal bowel movements, has noticed change in his stream but no retention or urgency with urination. Denies constant chest pain but some stabbing pain here and there, denies any known lung conditions.       REVIEW OF SYSTEMS:  Pertinent positive and negative symptoms per HPI.       MEDICAL HISTORY     No past medical history on file.    No past surgical history on file.    No family history on file.    Social History     Tobacco Use    Smoking status: Every Day     Current packs/day: 0.50     Types: Cigarettes    Smokeless tobacco: Former     Types: Chew         PHYSICAL EXAM     First Vitals:  Patient Vitals for the past 24 hrs:   BP Temp Pulse Resp SpO2 Height Weight   07/18/25 1551 -- -- 80 21 96 % -- --   07/18/25 1538 (!) 133/97 -- 79 -- 95 % -- --   07/18/25 1518 -- -- 83 20 96 % -- --   07/18/25 1408 -- 98.9  F (37.2  C) -- -- -- -- --   07/18/25 1405 (!) 151/110 -- 91 19 97 % 1.88 m (6' 2\") 90.7 kg (200 lb)       PHYSICAL EXAM:  Physical Exam  Vitals and nursing note reviewed.   Constitutional:       General: He is not in acute distress.     Appearance: Normal appearance. He is well-developed. He is not ill-appearing or toxic-appearing. "   HENT:      Head: Normocephalic and atraumatic.      Comments: Large, fluid-like mass to top of head. No tenderness to palpation.  Eyes:      General: No scleral icterus.     Conjunctiva/sclera: Conjunctivae normal.   Cardiovascular:      Rate and Rhythm: Normal rate and regular rhythm.      Pulses:           Radial pulses are 2+ on the right side and 2+ on the left side.        Dorsalis pedis pulses are 2+ on the right side and 2+ on the left side.      Heart sounds: Normal heart sounds.   Pulmonary:      Effort: Pulmonary effort is normal. No accessory muscle usage or respiratory distress.      Breath sounds: No stridor. Wheezing (diffuse expiratory) present. No rhonchi.   Abdominal:      Palpations: Abdomen is soft.   Musculoskeletal:         General: No deformity.      Cervical back: Neck supple.      Right lower le+ Edema present.      Left lower le+ Edema present.   Skin:     General: Skin is warm and dry.   Neurological:      General: No focal deficit present.      Mental Status: He is alert and oriented to person, place, and time.   Psychiatric:         Mood and Affect: Mood normal.          RESULTS     LAB:  All pertinent labs reviewed and interpreted  Labs Ordered and Resulted from Time of ED Arrival to Time of ED Departure   BASIC METABOLIC PANEL - Abnormal       Result Value    Sodium 139      Potassium 4.4      Chloride 107      Carbon Dioxide (CO2) 22      Anion Gap 10      Urea Nitrogen 30.2 (*)     Creatinine 1.42 (*)     GFR Estimate 58 (*)     Calcium 10.7 (*)     Glucose 106 (*)    TROPONIN T, HIGH SENSITIVITY - Abnormal    Troponin T, High Sensitivity 48 (*)    NT-PROBNP - Abnormal    NT-proBNP 5,811 (*)    TROPONIN T, HIGH SENSITIVITY - Abnormal    Troponin T, High Sensitivity 48 (*)    TSH WITH FREE T4 REFLEX - Normal    TSH 1.79     MAGNESIUM - Normal    Magnesium 1.7     CBC WITH PLATELETS AND DIFFERENTIAL    WBC Count 6.5      RBC Count 4.73      Hemoglobin 14.6      Hematocrit 45.2       MCV 96      MCH 30.9      MCHC 32.3      RDW 13.8      Platelet Count 168      % Neutrophils 65      % Lymphocytes 20      % Monocytes 12      % Eosinophils 2      % Basophils 1      % Immature Granulocytes 1      NRBCs per 100 WBC 0      Absolute Neutrophils 4.2      Absolute Lymphocytes 1.3      Absolute Monocytes 0.8      Absolute Eosinophils 0.1      Absolute Basophils 0.1      Absolute Immature Granulocytes 0.0      Absolute NRBCs 0.0         RADIOLOGY:  XR Chest 2 Views   Final Result   IMPRESSION:       Cardiac silhouette is enlarged, without specific chamber enlargement. There are mild aortic atheromatous calcifications. The vascular pedicle is normal. Lung vascularity is normal.      Symmetric inflation of the lungs. No interstitial or alveolar opacities. Diaphragm curvature is normal. No pleural effusion.      Minimal thoracic degenerative osteophytes. Radiopaque BB projects in the left axilla.          ECG:  Performed at: 1418 on 7/18/25    Impression: Sinus rhythm with occasional PVC's and PAC's, possible left atrial enlargement, left anterior fascicular block, ST and T wave abnormality - consider lateral ischemia    Rate: 87 bpm  Rhythm: Sinus  Axis: Normal  GA Interval: 158 ms  QRS Interval: 106 ms  QTc Interval: 459 ms  ST Changes: See above.  Comparison: N/A    EKG results reviewed and interpreted by Dr. Kulkarni, DANIEL HERNÁNDEZ and Daysi Allen PA-C.         Jackie JOSHI, am serving as a scribe to document services personally performed by Daysi Allen PA-C, based on my observation and the provider's statements to me. Daysi JOSHI PA-C attest that Jackie Padilla is acting in a scribe capacity, has observed my performance of the services and has documented them in accordance with my direction.       Daysi Allen PA-C  Emergency Medicine   [facility] EMERGENCY ROOM       Daysi Allen PA-C  07/18/25 3804

## 2025-07-18 NOTE — DISCHARGE INSTRUCTIONS
The labs today indicate congestive heart failure meaning your heart isn't pumping properly, causing you to retain fluids. You will be started on a water pill so that you pee out all the extra fluid instead of retaining it in spaces like your legs. It's very important that you follow-up with a primary care provider (which I've given a referral for) to continue the water pill prescriptions. Side effects including dizziness, lightheadedness and frequent urination so I recommend taking this medication in the morning to avoid having to get up to go to the bathroom all night. I also recommend cutting down on the smoking to help with the wheezing and shortness of breath. Please feel free to return to the ED for any new or worsening symptoms.

## 2025-07-18 NOTE — ED TRIAGE NOTES
Pt here with possible CHF. Went to a wellness check at work and the provider heard some wheezing. He reports his legs have been swollen and has had SOB especially while laying flat for awhile. 5-6 cigarettes per day. Dad and brother have significant heart history      Triage Assessment (Adult)       Row Name 07/18/25 1400          Triage Assessment    Airway WDL WDL        Respiratory WDL    Respiratory WDL rhythm/pattern     Rhythm/Pattern, Respiratory shortness of breath;dyspnea upon exertion        Skin Circulation/Temperature WDL    Skin Circulation/Temperature WDL WDL        Cardiac WDL    Cardiac WDL WDL        Peripheral/Neurovascular WDL    Peripheral Neurovascular WDL WDL        Cognitive/Neuro/Behavioral WDL    Cognitive/Neuro/Behavioral WDL WDL

## 2025-07-18 NOTE — ED PROVIDER NOTES
Emergency Department Midlevel Supervisory Note     I had a face to face encounter with this patient seen by the Advanced Practice Provider (SERGIO). I personally made/approved the management plan and take responsibility for the patient management. I personally saw patient and performed a substantive portion of the visit including all aspects of the medical decision making.     ED Course:  2:40 PM Daysi Allen PA-C staffed patient with me. I agree with their assessment and plan of management, and I will see the patient.  3:20 PM  I met with the patient to introduce myself, gather additional history, perform my initial exam, and discuss the plan.     EKG: I reviewed and independently interpreted the patient's EKG, with comments made as listed below. Please see scanned EKG for full report.     Independently reviewed and interpreted by me  Performed at: 2:18 PM  Impression: Occasional PVCs, nonspecific ST changes, left anterior fascicular block  Rate: 87  Rhythm: Sinus  Axis: Normal  [place] Interval: 150  QRS Interval: 106  QTc Interval: 459  ST Changes: No acute ischemic changes  Comparison: No prior    Procedures:  I was present for the key portions of procedures documented in SERGIO/midlevel note, see midlevel note for further details.    MDM:  ED Course as of 07/18/25 1647   Fri Jul 18, 2025   1444 Patient presents with wheezing and several weeks of leg swelling.  On exam, vitally stable, mild diffuse wheezes, also lower extremity swelling.  Labs ordered.  Smokes cigarettes.   1520 Patient seen and examined, presents today with multiple concerns.  He has noted some increased shortness of breath and orthopnea as well as bilateral foot swelling for a couple of weeks, went to a medical event at work today and was sent to the emergency department.  On exam here, trace crackle in the left lung on exam, vitally stable with no hypoxia, BNP elevated at 5181 chest x-ray without findings of fulminant failure.  Patient will be  "started on Lasix and plan to discharge.  Also noted to have elevated creatinine of 1.42, this is stable from about a year ago but will need to be followed closely.  He also mentions a lump on the left side of his head that has been there for 20 years.  This measures about 5 to 6 cm in diameter and is soft and mobile but feels a little flocculent in areas.  It does not appear infectious, likely more cystic, feels less solid than the lipoma.  Patient to follow-up with primary care for consideration of imaging and possible surgical referral for excision.  Initial troponin is 48, this likely is due to poor clearance related to CKD but will be repeated   1647 Repeat troponin stable, stable for discharge with Lasix and outpatient follow-up       1. Acute exacerbation of CHF (congestive heart failure) (H)        Brief HPI:     Amarjit Cano is a 59 year old male who presents for evaluation of shortness of breath, foot swelling, this been going on for couple of weeks.  No chest pain, some orthopnea      Brief Physical Exam: BP (!) 133/97   Pulse 80   Temp 98.9  F (37.2  C)   Resp 21   Ht 1.88 m (6' 2\")   Wt 90.7 kg (200 lb)   SpO2 96%   BMI 25.68 kg/m    Physical Exam  Vitals and nursing note reviewed.   Constitutional:       Appearance: Normal appearance.   HENT:      Head: Atraumatic.      Comments: Left occipital parietal by 6 cm mobile soft mass with possible fluid components, nonpulsatile     Right Ear: External ear normal.      Left Ear: External ear normal.      Nose: Nose normal.   Eyes:      Extraocular Movements: Extraocular movements intact.      Conjunctiva/sclera: Conjunctivae normal.      Pupils: Pupils are equal, round, and reactive to light.   Pulmonary:      Effort: Pulmonary effort is normal.      Breath sounds: Rales (trace on left) present.   Musculoskeletal:         General: No swelling or deformity. Normal range of motion.      Cervical back: Normal range of motion.      Right lower leg: " Edema present.      Left lower leg: Edema present.      Comments: Trace bilateral lower extremity edema feet and ankles   Neurological:      General: No focal deficit present.      Mental Status: He is alert and oriented to person, place, and time. Mental status is at baseline.   Psychiatric:         Mood and Affect: Mood normal.         Behavior: Behavior normal.         Thought Content: Thought content normal.     Labs and Imaging:  Results for orders placed or performed during the hospital encounter of 07/18/25   XR Chest 2 Views    Impression    IMPRESSION:     Cardiac silhouette is enlarged, without specific chamber enlargement. There are mild aortic atheromatous calcifications. The vascular pedicle is normal. Lung vascularity is normal.    Symmetric inflation of the lungs. No interstitial or alveolar opacities. Diaphragm curvature is normal. No pleural effusion.    Minimal thoracic degenerative osteophytes. Radiopaque BB projects in the left axilla.   Basic metabolic panel   Result Value Ref Range    Sodium 139 135 - 145 mmol/L    Potassium 4.4 3.4 - 5.3 mmol/L    Chloride 107 98 - 107 mmol/L    Carbon Dioxide (CO2) 22 22 - 29 mmol/L    Anion Gap 10 7 - 15 mmol/L    Urea Nitrogen 30.2 (H) 6.0 - 20.0 mg/dL    Creatinine 1.42 (H) 0.67 - 1.17 mg/dL    GFR Estimate 58 (L) >60 mL/min/1.73m2    Calcium 10.7 (H) 8.8 - 10.4 mg/dL    Glucose 106 (H) 70 - 99 mg/dL   Result Value Ref Range    Troponin T, High Sensitivity 48 (H) <=22 ng/L   NT-proBNP   Result Value Ref Range    NT-proBNP 5,811 (H) 0 - 177 pg/mL   CBC with platelets and differential   Result Value Ref Range    WBC Count 6.5 4.0 - 11.0 10e3/uL    RBC Count 4.73 4.40 - 5.90 10e6/uL    Hemoglobin 14.6 13.3 - 17.7 g/dL    Hematocrit 45.2 40.0 - 53.0 %    MCV 96 78 - 100 fL    MCH 30.9 26.5 - 33.0 pg    MCHC 32.3 31.5 - 36.5 g/dL    RDW 13.8 10.0 - 15.0 %    Platelet Count 168 150 - 450 10e3/uL    % Neutrophils 65 %    % Lymphocytes 20 %    % Monocytes 12 %     % Eosinophils 2 %    % Basophils 1 %    % Immature Granulocytes 1 %    NRBCs per 100 WBC 0 <1 /100    Absolute Neutrophils 4.2 1.6 - 8.3 10e3/uL    Absolute Lymphocytes 1.3 0.8 - 5.3 10e3/uL    Absolute Monocytes 0.8 0.0 - 1.3 10e3/uL    Absolute Eosinophils 0.1 0.0 - 0.7 10e3/uL    Absolute Basophils 0.1 0.0 - 0.2 10e3/uL    Absolute Immature Granulocytes 0.0 <=0.4 10e3/uL    Absolute NRBCs 0.0 10e3/uL   Extra Blue Top Tube   Result Value Ref Range    Hold Specimen JIC    Extra Purple Top Tube   Result Value Ref Range    Hold Specimen JIC    TSH with free T4 reflex   Result Value Ref Range    TSH 1.79 0.30 - 4.20 uIU/mL   Result Value Ref Range    Magnesium 1.7 1.7 - 2.3 mg/dL   Result Value Ref Range    Troponin T, High Sensitivity 48 (H) <=22 ng/L   ECG 12-LEAD WITH MUSE (LHE)   Result Value Ref Range    Systolic Blood Pressure  mmHg    Diastolic Blood Pressure  mmHg    Ventricular Rate 87 BPM    Atrial Rate 87 BPM    WA Interval 158 ms    QRS Duration 106 ms     ms    QTc 459 ms    P Axis 60 degrees    R AXIS -54 degrees    T Axis 115 degrees    Interpretation ECG       Sinus rhythm with occasional Premature ventricular complexes and Premature atrial complexes  Possible Left atrial enlargement  Left anterior fascicular block  ST & T wave abnormality, consider lateral ischemia  Abnormal ECG  No previous ECGs available  Confirmed by SEE ED PROVIDER NOTE FOR, ECG INTERPRETATION ([id number]),  NITHYA WASHBURN ([id number]) on 7/18/2025 4:42:40 PM       Darek Kulkarni M.D.  [facility] EMERGENCY ROOM  [address] [address] [phone]     Darek Kulkarni MD  07/18/25 4754

## 2025-08-09 ENCOUNTER — HEALTH MAINTENANCE LETTER (OUTPATIENT)
Age: 60
End: 2025-08-09